# Patient Record
Sex: FEMALE | Race: WHITE | NOT HISPANIC OR LATINO | URBAN - METROPOLITAN AREA
[De-identification: names, ages, dates, MRNs, and addresses within clinical notes are randomized per-mention and may not be internally consistent; named-entity substitution may affect disease eponyms.]

---

## 2017-06-08 ENCOUNTER — APPOINTMENT (RX ONLY)
Dept: URBAN - METROPOLITAN AREA OTHER 9 | Facility: OTHER | Age: 42
Setting detail: DERMATOLOGY
End: 2017-06-08

## 2017-06-08 DIAGNOSIS — L71.8 OTHER ROSACEA: ICD-10-CM

## 2017-06-08 PROCEDURE — ? PRESCRIPTION

## 2017-06-08 PROCEDURE — ? TREATMENT REGIMEN

## 2017-06-08 PROCEDURE — 99202 OFFICE O/P NEW SF 15 MIN: CPT

## 2017-06-08 PROCEDURE — ? COUNSELING

## 2017-06-08 RX ORDER — IVERMECTIN 10 MG/G
CREAM TOPICAL
Qty: 1 | Refills: 0 | Status: ERX | COMMUNITY
Start: 2017-06-08

## 2017-06-08 RX ORDER — DOXYCYCLINE 40 MG/1
CAPSULE ORAL
Qty: 30 | Refills: 0 | Status: ERX | COMMUNITY
Start: 2017-06-08

## 2017-06-08 RX ADMIN — IVERMECTIN: 10 CREAM TOPICAL at 19:16

## 2017-06-08 RX ADMIN — DOXYCYCLINE: 40 CAPSULE ORAL at 19:16

## 2017-06-08 ASSESSMENT — SEVERITY ASSESSMENT OVERALL AMONG ALL PATIENTS
IN YOUR EXPERIENCE, AMONG ALL PATIENTS YOU HAVE SEEN WITH THIS CONDITION, HOW SEVERE IS THIS PATIENT'S CONDITION?: MILD TO MODERATE

## 2017-06-08 ASSESSMENT — LOCATION ZONE DERM: LOCATION ZONE: FACE

## 2017-06-08 ASSESSMENT — LOCATION DETAILED DESCRIPTION DERM: LOCATION DETAILED: LEFT CENTRAL MALAR CHEEK

## 2017-06-08 ASSESSMENT — LOCATION SIMPLE DESCRIPTION DERM: LOCATION SIMPLE: LEFT CHEEK

## 2017-06-08 NOTE — PROCEDURE: TREATMENT REGIMEN
Continue Regimen: Neutrogena pore minimizer cleanser qd
Detail Level: Simple
Samples Given: Cetaphil redness cleanser to try \\nSoolantra qd\\nOracea 40 mg 1 po qd

## 2017-06-29 ENCOUNTER — APPOINTMENT (RX ONLY)
Dept: URBAN - METROPOLITAN AREA OTHER 9 | Facility: OTHER | Age: 42
Setting detail: DERMATOLOGY
End: 2017-06-29

## 2017-06-29 DIAGNOSIS — L71.8 OTHER ROSACEA: ICD-10-CM | Status: IMPROVED

## 2017-06-29 PROCEDURE — 99213 OFFICE O/P EST LOW 20 MIN: CPT

## 2017-06-29 PROCEDURE — ? PRESCRIPTION

## 2017-06-29 PROCEDURE — ? COUNSELING

## 2017-06-29 PROCEDURE — ? TREATMENT REGIMEN

## 2017-06-29 RX ORDER — DOXYCYCLINE 40 MG/1
CAPSULE ORAL
Qty: 30 | Refills: 1 | Status: ERX

## 2017-06-29 ASSESSMENT — LOCATION SIMPLE DESCRIPTION DERM: LOCATION SIMPLE: LEFT CHEEK

## 2017-06-29 ASSESSMENT — LOCATION ZONE DERM: LOCATION ZONE: FACE

## 2017-06-29 ASSESSMENT — LOCATION DETAILED DESCRIPTION DERM: LOCATION DETAILED: LEFT CENTRAL MALAR CHEEK

## 2017-06-29 NOTE — PROCEDURE: TREATMENT REGIMEN
Continue Regimen: Cetaphil redness reducing cleanser qd\\nNeutrogena pore minimized cleanser qd\\nSoolantra cream qd\\nOracea caps 1 po qd
Detail Level: Simple